# Patient Record
Sex: MALE | Race: WHITE | NOT HISPANIC OR LATINO | Employment: OTHER | URBAN - METROPOLITAN AREA
[De-identification: names, ages, dates, MRNs, and addresses within clinical notes are randomized per-mention and may not be internally consistent; named-entity substitution may affect disease eponyms.]

---

## 2017-05-03 ENCOUNTER — CLINICAL SUPPORT (OUTPATIENT)
Dept: SMOKING CESSATION | Facility: CLINIC | Age: 62
End: 2017-05-03
Payer: COMMERCIAL

## 2017-05-03 DIAGNOSIS — F17.200 NICOTINE DEPENDENCE: Primary | ICD-10-CM

## 2017-05-03 PROCEDURE — 99407 BEHAV CHNG SMOKING > 10 MIN: CPT | Mod: S$GLB,,,

## 2017-06-01 ENCOUNTER — CLINICAL SUPPORT (OUTPATIENT)
Dept: SMOKING CESSATION | Facility: CLINIC | Age: 62
End: 2017-06-01
Payer: COMMERCIAL

## 2017-06-01 DIAGNOSIS — F17.200 NICOTINE DEPENDENCE: ICD-10-CM

## 2017-06-01 PROCEDURE — 99404 PREV MED CNSL INDIV APPRX 60: CPT | Mod: S$GLB,,,

## 2017-06-01 PROCEDURE — 99999 PR PBB SHADOW E&M-EST. PATIENT-LVL I: CPT | Mod: PBBFAC,,,

## 2017-06-01 RX ORDER — IBUPROFEN 200 MG
1 TABLET ORAL DAILY
Qty: 14 PATCH | Refills: 0 | Status: SHIPPED | OUTPATIENT
Start: 2017-06-01 | End: 2017-06-15 | Stop reason: SDUPTHER

## 2017-06-01 RX ORDER — VARENICLINE TARTRATE 0.5 (11)-1
KIT ORAL
Qty: 1 PACKAGE | Refills: 0 | Status: SHIPPED | OUTPATIENT
Start: 2017-06-01 | End: 2017-06-29 | Stop reason: DRUGHIGH

## 2017-06-15 ENCOUNTER — CLINICAL SUPPORT (OUTPATIENT)
Dept: SMOKING CESSATION | Facility: CLINIC | Age: 62
End: 2017-06-15
Payer: COMMERCIAL

## 2017-06-15 DIAGNOSIS — F17.200 NICOTINE DEPENDENCE: ICD-10-CM

## 2017-06-15 PROCEDURE — 99402 PREV MED CNSL INDIV APPRX 30: CPT | Mod: S$GLB,,,

## 2017-06-15 PROCEDURE — 99999 PR PBB SHADOW E&M-EST. PATIENT-LVL I: CPT | Mod: PBBFAC,,,

## 2017-06-15 RX ORDER — IBUPROFEN 200 MG
1 TABLET ORAL DAILY
Qty: 14 PATCH | Refills: 0 | Status: SHIPPED | OUTPATIENT
Start: 2017-06-15 | End: 2017-06-29 | Stop reason: SDUPTHER

## 2017-06-15 NOTE — Clinical Note
pt presents smoking less then 10 cigarettes per day,pt is currently wearing the  Nicotine patch every day 21mg, goal set to be smoke free in 3 weeks, discussed with the patient that he will need to ideally quit smoking over the next three weeks while on the starter pack of chantix, this way he will have two more months of this medication regimen to help prevent lapse. Session handout provided to the patient and discussed, pt reports no negative s/e, recommend he continue the 21mg nicotine patch with plan to do decrease in dose of the nicotine patch next f/u,  will follow

## 2017-06-29 ENCOUNTER — CLINICAL SUPPORT (OUTPATIENT)
Dept: SMOKING CESSATION | Facility: CLINIC | Age: 62
End: 2017-06-29
Payer: COMMERCIAL

## 2017-06-29 DIAGNOSIS — F17.200 NICOTINE DEPENDENCE: Primary | ICD-10-CM

## 2017-06-29 PROCEDURE — 99402 PREV MED CNSL INDIV APPRX 30: CPT | Mod: S$GLB,,,

## 2017-06-29 PROCEDURE — 99999 PR PBB SHADOW E&M-EST. PATIENT-LVL I: CPT | Mod: PBBFAC,,,

## 2017-06-29 RX ORDER — IBUPROFEN 200 MG
1 TABLET ORAL DAILY
Qty: 14 PATCH | Refills: 0 | Status: SHIPPED | OUTPATIENT
Start: 2017-06-29 | End: 2017-07-17 | Stop reason: SDUPTHER

## 2017-06-29 RX ORDER — VARENICLINE TARTRATE 1 MG/1
1 TABLET, FILM COATED ORAL 2 TIMES DAILY
Qty: 60 TABLET | Refills: 0 | Status: SHIPPED | OUTPATIENT
Start: 2017-06-29 | End: 2017-07-29

## 2017-06-29 NOTE — PROGRESS NOTES
Individual Follow-Up Form    6/29/2017    Quit Date: 7/13/17      Clinical Status of Patient: Outpatient    Length of Service: 30 minutes    Continuing Medication: yes  Chantix or Patches    Other Medications: n/a     Target Symptoms: Withdrawal and medication side effects. The following were  rated moderate (3) to severe (4) on TCRS:  · Moderate (3): 0  · Severe (4): 0    Comments: pt presents smoking about 10 cigarettes per day, continuing the 21mg patch, as well as chantix starter pack,  He is currently on last week of chantix therefore will order pt another dose of chantix and recommend continuing 2 more months following the starter pack, he is also on a 21mg nicotine patch daily, recommend he continue with this dose for another two weeks, pt has not cut back any more since last appt he has a mindset that he will put them down on 7/13 therefore he has stopped cutting back, recommend that he continue to cut back further and set goal for 5 cigarettes over the next two weeks, session handout provided to the patient and discussed, will continue to monitor      Diagnosis: F17.210    Next Visit: 2 weeks

## 2017-06-29 NOTE — Clinical Note
pt presents smoking about 10 cigarettes per day, continuing the 21mg patch, as well as chantix starter pack,  He is currently on last week of chantix therefore will order pt another dose of chantix and recommend continuing 2 more months following the starter pack, he is also on a 21mg nicotine patch daily, recommend he continue with this dose for another two weeks, pt has not cut back any more since last appt he has a mindset that he will put them down on 7/13 therefore he has stopped cutting back, recommend that he continue to cut back further and set goal for 5 cigarettes over the next two weeks, session handout provided to the patient and discussed, will continue to monitor

## 2017-07-17 ENCOUNTER — CLINICAL SUPPORT (OUTPATIENT)
Dept: SMOKING CESSATION | Facility: CLINIC | Age: 62
End: 2017-07-17
Payer: COMMERCIAL

## 2017-07-17 DIAGNOSIS — F17.200 NICOTINE DEPENDENCE: ICD-10-CM

## 2017-07-17 PROCEDURE — 99402 PREV MED CNSL INDIV APPRX 30: CPT | Mod: S$GLB,,,

## 2017-07-17 PROCEDURE — 99999 PR PBB SHADOW E&M-EST. PATIENT-LVL I: CPT | Mod: PBBFAC,,,

## 2017-07-17 RX ORDER — IBUPROFEN 200 MG
1 TABLET ORAL DAILY
Qty: 14 PATCH | Refills: 0 | Status: SHIPPED | OUTPATIENT
Start: 2017-07-17 | End: 2017-07-31 | Stop reason: SDUPTHER

## 2017-07-17 NOTE — PROGRESS NOTES
Individual Follow-Up Form    7/17/2017    Quit Date: 7/15/17    Clinical Status of Patient: Outpatient    Length of Service: 30 minutes    Continuing Medication: yes  Chantix or Patches    Other Medications: n/a     Target Symptoms: Withdrawal and medication side effects. The following were  rated moderate (3) to severe (4) on TCRS:  · Moderate (3): 0  · Severe (4): 0    Comments: pt presents quit however has had 3 slips smoking 3 cigarettes since his quit, he is not taking chantix consistently, educated the patient on taking the chantix compliantly. He is currently on the nicotine patch 21mg and feel it is imperative to keep him on this dose for another 2 weeks, session handout provided and discussed, will continue to monitor and follow     Diagnosis: F17.210    Next Visit: 2 weeks

## 2017-07-17 NOTE — Clinical Note
pt presents quit however has had 3 slips smoking 3 cigarettes since his quit, he is not taking chantix consistently, educated the patient on taking the chantix compliantly. He is currently on the nicotine patch 21mg and feel it is imperative to keep him on this dose for another 2 weeks, session handout provided and discussed, will continue to monitor and follow

## 2017-07-31 ENCOUNTER — CLINICAL SUPPORT (OUTPATIENT)
Dept: SMOKING CESSATION | Facility: CLINIC | Age: 62
End: 2017-07-31
Payer: COMMERCIAL

## 2017-07-31 DIAGNOSIS — F17.200 NICOTINE DEPENDENCE: Primary | ICD-10-CM

## 2017-07-31 PROCEDURE — 99402 PREV MED CNSL INDIV APPRX 30: CPT | Mod: S$GLB,,,

## 2017-07-31 RX ORDER — IBUPROFEN 200 MG
1 TABLET ORAL DAILY
Qty: 14 PATCH | Refills: 0 | Status: SHIPPED | OUTPATIENT
Start: 2017-07-31 | End: 2017-08-30

## 2017-07-31 NOTE — Clinical Note
Patient presents quit as of today, he decide that when he woke up this morning this would be the day he would quit. He is not taking chantix consistently, educated the patient on taking the chantix compliantly. He is currently on the nicotine patch 21mg QD will decrease to 14mg in  another 2 weeks, session handout provided and discussed, will continue to monitor and follow.

## 2017-07-31 NOTE — PROGRESS NOTES
Individual Follow-Up Form    7/31/2017    Quit Date:     Clinical Status of Patient: Outpatient    Length of Service: 30 minutes    Continuing Medication: yes  Chantix    Other Medications: 21 mg Nicotine patch     Target Symptoms: Withdrawal and medication side effects. The following were  rated moderate (3) to severe (4) on TCRS:  · Moderate (3): 0  · Severe (4): 0    Comments: Patient presents quit as of today, he decide that when he woke up this morning this would be the day he would quit. He is not taking chantix consistently, educated the patient on taking the chantix compliantly. He is currently on the nicotine patch 21mg QD will decrease to 14mg in  another 2 weeks, session handout provided and discussed, will continue to monitor and follow.    Diagnosis: F17.210    Next Visit: 2 weeks

## 2017-08-18 ENCOUNTER — TELEPHONE (OUTPATIENT)
Dept: FAMILY MEDICINE | Facility: CLINIC | Age: 62
End: 2017-08-18

## 2017-08-18 NOTE — TELEPHONE ENCOUNTER
----- Message from Danae Guadarrama MD sent at 8/3/2017 12:24 PM CDT -----  Who is patient's PCP?  ----- Message -----  [facility]  Sent: 7/31/2017   4:22 PM  To: John Christina MD    Patient presents quit as of today, he decide that when he woke up this morning this would be the day he would quit. He is not taking chantix consistently, educated the patient on taking the chantix compliantly. He is currently on the nicotine patch 21mg QD will decrease to 14mg in  another 2 weeks, session handout provided and discussed, will continue to monitor and follow.

## 2017-08-18 NOTE — TELEPHONE ENCOUNTER
Spoke w/patient, states on his insurance card he has a female listed as his PCP. Declined appt. Sates if he get sick he will just come in and see anyone.

## 2017-12-21 ENCOUNTER — TELEPHONE (OUTPATIENT)
Dept: SMOKING CESSATION | Facility: CLINIC | Age: 62
End: 2017-12-21

## 2017-12-27 ENCOUNTER — CLINICAL SUPPORT (OUTPATIENT)
Dept: SMOKING CESSATION | Facility: CLINIC | Age: 62
End: 2017-12-27
Payer: COMMERCIAL

## 2017-12-27 DIAGNOSIS — F17.200 NICOTINE DEPENDENCE: Primary | ICD-10-CM

## 2017-12-27 PROCEDURE — 99407 BEHAV CHNG SMOKING > 10 MIN: CPT | Mod: S$GLB,,,

## 2018-01-02 ENCOUNTER — CLINICAL SUPPORT (OUTPATIENT)
Dept: SMOKING CESSATION | Facility: CLINIC | Age: 63
End: 2018-01-02
Payer: COMMERCIAL

## 2018-01-02 DIAGNOSIS — F17.200 NICOTINE DEPENDENCE: Primary | ICD-10-CM

## 2018-01-02 PROCEDURE — 99999 PR PBB SHADOW E&M-EST. PATIENT-LVL I: CPT | Mod: PBBFAC,,,

## 2018-01-02 PROCEDURE — 99404 PREV MED CNSL INDIV APPRX 60: CPT | Mod: S$GLB,,,

## 2018-01-02 RX ORDER — VARENICLINE TARTRATE 0.5 (11)-1
KIT ORAL
Qty: 1 PACKAGE | Refills: 0 | Status: SHIPPED | OUTPATIENT
Start: 2018-01-02 | End: 2018-01-18 | Stop reason: CLARIF

## 2018-01-02 RX ORDER — IBUPROFEN 200 MG
1 TABLET ORAL DAILY
Qty: 14 PATCH | Refills: 0 | Status: SHIPPED | OUTPATIENT
Start: 2018-01-02 | End: 2018-01-18 | Stop reason: SDUPTHER

## 2018-01-02 RX ORDER — MICONAZOLE NITRATE 2 %
2 CREAM (GRAM) TOPICAL
Qty: 100 EACH | Refills: 0 | Status: SHIPPED | OUTPATIENT
Start: 2018-01-02 | End: 2018-02-02

## 2018-01-18 ENCOUNTER — CLINICAL SUPPORT (OUTPATIENT)
Dept: SMOKING CESSATION | Facility: CLINIC | Age: 63
End: 2018-01-18
Payer: COMMERCIAL

## 2018-01-18 DIAGNOSIS — F17.200 NICOTINE DEPENDENCE: ICD-10-CM

## 2018-01-18 PROCEDURE — 99999 PR PBB SHADOW E&M-EST. PATIENT-LVL I: CPT | Mod: PBBFAC,,,

## 2018-01-18 PROCEDURE — 99402 PREV MED CNSL INDIV APPRX 30: CPT | Mod: S$GLB,,,

## 2018-01-18 RX ORDER — IBUPROFEN 200 MG
1 TABLET ORAL DAILY
Qty: 14 PATCH | Refills: 0 | Status: SHIPPED | OUTPATIENT
Start: 2018-01-18 | End: 2018-02-18

## 2018-01-18 NOTE — Clinical Note
Comments: patient presents for f/u smoking 10 cigarettes per day, prior to the start of the program patient was smoking 30 cigarettes per day however has been stuck at 10 for a month now, recommend he continue the 21mg nicotine patch and increase use of oral NRT to substitute the cigarettes, session handout provided, goal set for the patient to  Be down to 5 cigarettes over the next two weeks, denies any negative s/e, will continue to monitor and follow

## 2018-01-18 NOTE — PROGRESS NOTES
Individual Follow-Up Form    1/18/2018    Quit Date: not selected at this time     Clinical Status of Patient: Outpatient    Length of Service: 30 minutes    Continuing Medication: yes  Patches or Nicotine gum    Other Medications: n/a     Target Symptoms: Withdrawal and medication side effects. The following were  rated moderate (3) to severe (4) on TCRS:  · Moderate (3): 0  · Severe (4): 0    Comments: patient presents for f/u smoking 10 cigarettes per day, prior to the start of the program patient was smoking 30 cigarettes per day however has been stuck at 10 for a month now, recommend he continue the 21mg nicotine patch and increase use of oral NRT to substitute the cigarettes, session handout provided, goal set for the patient to  Be down to 5 cigarettes over the next two weeks, denies any negative s/e, will continue to monitor and follow     Diagnosis: F17.210    Next Visit: 2 weeks

## 2018-02-01 ENCOUNTER — TELEPHONE (OUTPATIENT)
Dept: SMOKING CESSATION | Facility: CLINIC | Age: 63
End: 2018-02-01

## 2018-04-17 ENCOUNTER — CLINICAL SUPPORT (OUTPATIENT)
Dept: SMOKING CESSATION | Facility: CLINIC | Age: 63
End: 2018-04-17
Payer: COMMERCIAL

## 2018-04-17 DIAGNOSIS — F17.200 NICOTINE DEPENDENCE: Primary | ICD-10-CM

## 2018-04-17 PROCEDURE — 99407 BEHAV CHNG SMOKING > 10 MIN: CPT | Mod: S$GLB,,,

## 2018-05-07 ENCOUNTER — CLINICAL SUPPORT (OUTPATIENT)
Dept: SMOKING CESSATION | Facility: CLINIC | Age: 63
End: 2018-05-07
Payer: COMMERCIAL

## 2018-05-07 DIAGNOSIS — F17.200 NICOTINE DEPENDENCE: Primary | ICD-10-CM

## 2018-05-07 PROCEDURE — 99404 PREV MED CNSL INDIV APPRX 60: CPT | Mod: S$GLB,,,

## 2018-05-07 PROCEDURE — 99999 PR PBB SHADOW E&M-EST. PATIENT-LVL I: CPT | Mod: PBBFAC,,,

## 2018-05-07 RX ORDER — DIPHENHYDRAMINE HCL 25 MG
4 CAPSULE ORAL
Qty: 100 EACH | Refills: 0 | Status: SHIPPED | OUTPATIENT
Start: 2018-05-07 | End: 2018-06-07

## 2018-05-07 RX ORDER — IBUPROFEN 200 MG
1 TABLET ORAL DAILY
Qty: 14 PATCH | Refills: 0 | Status: SHIPPED | OUTPATIENT
Start: 2018-05-07 | End: 2018-06-07

## 2018-05-07 NOTE — Clinical Note
Patient presents for intake smoking 1 pack per day to 1.5 packs per day, recommend the 21mg nicotine patch daily in conjunction with the 2mg nicotine gum, recommend an abrupt quit,  discussed strategies to help cut back and to help break the routine and habit associated with smoking, intake handout provided to the patient and discussed, all prescribed NRT discussed in depth as well as tobacco cessation medication s/e as well as usage discussed, contact card provided to the patient. Will continue to follow every two weeks while in the program.

## 2018-06-29 ENCOUNTER — CLINICAL SUPPORT (OUTPATIENT)
Dept: SMOKING CESSATION | Facility: CLINIC | Age: 63
End: 2018-06-29
Payer: COMMERCIAL

## 2018-06-29 DIAGNOSIS — F17.200 NICOTINE DEPENDENCE: Primary | ICD-10-CM

## 2018-06-29 PROCEDURE — 99407 BEHAV CHNG SMOKING > 10 MIN: CPT | Mod: S$GLB,,,

## 2018-06-29 NOTE — PROGRESS NOTES
Called pt to f/u on his 6 month smoking cessation quit status for quit #5. Pt did come back for a quit #6 on 5/7/18, but never followed up. Pt stated he is still smoking. Informed him he has benefits available and is able to continue. Pt stated he has joined program several times and it has not worked for him. He is not interested in program. Will resolve quit #5 episode.

## 2019-01-07 ENCOUNTER — CLINICAL SUPPORT (OUTPATIENT)
Dept: SMOKING CESSATION | Facility: CLINIC | Age: 64
End: 2019-01-07
Payer: COMMERCIAL

## 2019-01-07 DIAGNOSIS — F17.200 NICOTINE DEPENDENCE: Primary | ICD-10-CM

## 2019-01-07 PROCEDURE — 99407 BEHAV CHNG SMOKING > 10 MIN: CPT | Mod: S$GLB,,,

## 2019-01-07 PROCEDURE — 99407 PR TOBACCO USE CESSATION INTENSIVE >10 MINUTES: ICD-10-PCS | Mod: S$GLB,,,

## 2019-01-07 NOTE — PROGRESS NOTES
Spoke with patient today in regards to smoking cessation progress 12 month phone follow up for quit 5 and 3/6 month for quit 6, states not tobacco free. Patient is interested in returning to the smoking cessation program scheduled an appointment . Informed patient of benefit period, phone follow up at 1 year, and contact information. Will resolve smart form for quit 5 and complete smart form for 3/6 month phone follow up on Quit attempt #6.

## 2019-01-14 ENCOUNTER — CLINICAL SUPPORT (OUTPATIENT)
Dept: SMOKING CESSATION | Facility: CLINIC | Age: 64
End: 2019-01-14
Payer: COMMERCIAL

## 2019-01-14 DIAGNOSIS — F17.200 NICOTINE DEPENDENCE: Primary | ICD-10-CM

## 2019-01-14 PROCEDURE — 99999 PR PBB SHADOW E&M-EST. PATIENT-LVL I: ICD-10-PCS | Mod: PBBFAC,,,

## 2019-01-14 PROCEDURE — 99404 PR PREVENT COUNSEL,INDIV,60 MIN: ICD-10-PCS | Mod: S$GLB,,,

## 2019-01-14 PROCEDURE — 99404 PREV MED CNSL INDIV APPRX 60: CPT | Mod: S$GLB,,,

## 2019-01-14 PROCEDURE — 99999 PR PBB SHADOW E&M-EST. PATIENT-LVL I: CPT | Mod: PBBFAC,,,

## 2019-01-14 RX ORDER — BUPROPION HYDROCHLORIDE 150 MG/1
150 TABLET, EXTENDED RELEASE ORAL 2 TIMES DAILY
Qty: 60 TABLET | Refills: 11 | Status: SHIPPED | OUTPATIENT
Start: 2019-01-14 | End: 2019-02-11

## 2019-01-14 RX ORDER — IBUPROFEN 200 MG
1 TABLET ORAL DAILY
Qty: 14 PATCH | Refills: 0 | Status: SHIPPED | OUTPATIENT
Start: 2019-01-14 | End: 2019-01-28 | Stop reason: SDUPTHER

## 2019-01-14 RX ORDER — DM/P-EPHED/ACETAMINOPH/DOXYLAM 30-7.5/3
2 LIQUID (ML) ORAL
Qty: 100 LOZENGE | Refills: 0 | Status: SHIPPED | OUTPATIENT
Start: 2019-01-14 | End: 2019-01-28 | Stop reason: SDUPTHER

## 2019-01-14 NOTE — Clinical Note
Pt presents for intakr smoking 1 pk of cigarettes per day, after assessment and disucssion recommend the 21mg nicotine patch in conjunction with the 2mg nicotine lozenge, recommend an abrupt quit and for patient to pick a quit date and start the NRT on that day. discussed strategies to help cut back and to help break the routine and habit associated with smoking, intake handout provided to the patient and discussed, all prescribed NRT discussed in depth as well as tobacco cessation medication s/e as well as usage discussed, contact card provided to the patient. Will continue to follow every two weeks while in the program.

## 2019-01-28 ENCOUNTER — CLINICAL SUPPORT (OUTPATIENT)
Dept: SMOKING CESSATION | Facility: CLINIC | Age: 64
End: 2019-01-28
Payer: COMMERCIAL

## 2019-01-28 DIAGNOSIS — F17.200 NICOTINE DEPENDENCE: ICD-10-CM

## 2019-01-28 PROCEDURE — 99402 PREV MED CNSL INDIV APPRX 30: CPT | Mod: S$GLB,,,

## 2019-01-28 PROCEDURE — 99999 PR PBB SHADOW E&M-EST. PATIENT-LVL I: ICD-10-PCS | Mod: PBBFAC,,,

## 2019-01-28 PROCEDURE — 99999 PR PBB SHADOW E&M-EST. PATIENT-LVL I: CPT | Mod: PBBFAC,,,

## 2019-01-28 PROCEDURE — 99402 PR PREVENT COUNSEL,INDIV,30 MIN: ICD-10-PCS | Mod: S$GLB,,,

## 2019-01-28 RX ORDER — IBUPROFEN 200 MG
1 TABLET ORAL DAILY
Qty: 14 PATCH | Refills: 0 | Status: SHIPPED | OUTPATIENT
Start: 2019-01-28 | End: 2019-02-11 | Stop reason: SDUPTHER

## 2019-01-28 RX ORDER — DM/P-EPHED/ACETAMINOPH/DOXYLAM 30-7.5/3
2 LIQUID (ML) ORAL
Qty: 100 LOZENGE | Refills: 0 | Status: SHIPPED | OUTPATIENT
Start: 2019-01-28 | End: 2019-02-26

## 2019-01-28 NOTE — PROGRESS NOTES
"Individual Follow-Up Form    1/28/2019    Quit Date: not selected at this time     Clinical Status of Patient: Outpatient    Length of Service: 30 minutes    Continuing Medication: yes  Wellbutrin, Patches or Nicotine Lozenges    Other Medications: n/a     Target Symptoms: Withdrawal and medication side effects. The following were  rated moderate (3) to severe (4) on TCRS:  · Moderate (3): 0  · Severe (4): 0    Comments: pt presents for follow up smoking 15 cigarettes per day, he has cut out about 5 cigarettes from his routine and that's with the 21mg nicotine patch and the 2mg nicotine lozenge about 10 per day, explained to the pt that "nicotine wise" he is getting the equivalence of 2-2.5 pks per day since he continues to smoke, it was recommended that he do an abrupt quit, pt did states that he recently was smoking 2 pks per day but started to cut back before attending his intake, recommend that he continue his regimen and continue to work on strategies to eliminate tobacco. Plan is for the patient to be closer to his quit date in the next two weeks. Will follow, session handout provided and discussed     Diagnosis: F17.210    Next Visit: 2 weeks  "

## 2019-02-11 ENCOUNTER — CLINICAL SUPPORT (OUTPATIENT)
Dept: SMOKING CESSATION | Facility: CLINIC | Age: 64
End: 2019-02-11
Payer: COMMERCIAL

## 2019-02-11 DIAGNOSIS — F17.200 NICOTINE DEPENDENCE: ICD-10-CM

## 2019-02-11 RX ORDER — DM/P-EPHED/ACETAMINOPH/DOXYLAM 30-7.5/3
2 LIQUID (ML) ORAL
Qty: 100 LOZENGE | Refills: 0 | Status: SHIPPED | OUTPATIENT
Start: 2019-02-11 | End: 2019-02-26

## 2019-02-11 RX ORDER — IBUPROFEN 200 MG
1 TABLET ORAL DAILY
Qty: 14 PATCH | Refills: 0 | Status: SHIPPED | OUTPATIENT
Start: 2019-02-11 | End: 2019-02-26 | Stop reason: SDUPTHER

## 2019-02-26 ENCOUNTER — CLINICAL SUPPORT (OUTPATIENT)
Dept: SMOKING CESSATION | Facility: CLINIC | Age: 64
End: 2019-02-26
Payer: COMMERCIAL

## 2019-02-26 DIAGNOSIS — F17.200 NICOTINE DEPENDENCE: ICD-10-CM

## 2019-02-26 PROCEDURE — 99402 PREV MED CNSL INDIV APPRX 30: CPT | Mod: S$GLB,,,

## 2019-02-26 PROCEDURE — 99402 PR PREVENT COUNSEL,INDIV,30 MIN: ICD-10-PCS | Mod: S$GLB,,,

## 2019-02-26 PROCEDURE — 99999 PR PBB SHADOW E&M-EST. PATIENT-LVL I: CPT | Mod: PBBFAC,,,

## 2019-02-26 PROCEDURE — 99999 PR PBB SHADOW E&M-EST. PATIENT-LVL I: ICD-10-PCS | Mod: PBBFAC,,,

## 2019-02-26 RX ORDER — DM/P-EPHED/ACETAMINOPH/DOXYLAM 30-7.5/3
2 LIQUID (ML) ORAL
Qty: 100 LOZENGE | Refills: 0 | Status: SHIPPED | OUTPATIENT
Start: 2019-02-26 | End: 2019-03-29

## 2019-02-26 RX ORDER — IBUPROFEN 200 MG
1 TABLET ORAL DAILY
Qty: 14 PATCH | Refills: 0 | Status: SHIPPED | OUTPATIENT
Start: 2019-02-26 | End: 2019-03-29

## 2019-02-26 NOTE — PROGRESS NOTES
Individual Follow-Up Form    2/26/2019    Quit Date: not selected at this time     Clinical Status of Patient: Outpatient    Length of Service: 30 minutes    Continuing Medication: yes  Patches or Nicotine Lozenges    Other Medications: n/a     Target Symptoms: Withdrawal and medication side effects. The following were  rated moderate (3) to severe (4) on TCRS:  · Moderate (3): 0  · Severe (4): 0    Comments: pt presents for follow up smoking about 10 cigarettes per day, he is currently on the 21mg nicotine patches and using the 2mg nicotine lozenge throughout the day as well as the cinommon tooth picks, recommend he continue this dose of the nicotine patches and the lozenge, recommend he continue working on rate reduction, this has been a slow process however he is progressing, down from over 2 pks per day, session handout provided and discussed, will continue to monitor and follow     Diagnosis: F17.210    Next Visit: 2 weeks

## 2019-02-26 NOTE — Clinical Note
Comments: pt presents for follow up smoking about 10 cigarettes per day, he is currently on the 21mg nicotine patches and using the 2mg nicotine lozenge throughout the day as well as the cinommon tooth picks, recommend he continue this dose of the nicotine patches and the lozenge, recommend he continue working on rate reduction, this has been a slow process however he is progressing, down from over 2 pks per day, session handout provided and discussed, will continue to monitor and follow

## 2019-05-24 ENCOUNTER — CLINICAL SUPPORT (OUTPATIENT)
Dept: SMOKING CESSATION | Facility: CLINIC | Age: 64
End: 2019-05-24
Payer: COMMERCIAL

## 2019-05-24 DIAGNOSIS — F17.200 NICOTINE DEPENDENCE: Primary | ICD-10-CM

## 2019-05-24 PROCEDURE — 99407 PR TOBACCO USE CESSATION INTENSIVE >10 MINUTES: ICD-10-PCS | Mod: S$GLB,,,

## 2019-05-24 PROCEDURE — 99407 BEHAV CHNG SMOKING > 10 MIN: CPT | Mod: S$GLB,,,

## 2019-05-24 NOTE — PROGRESS NOTES
Called pt to f/u on his 12 month smoking cessation quit status. Pt stated he is still smoking. Informed him he has benefits available and is able to rejoin. Pt scheduled appointment for quit #7 on 6/4/19. Informed him of benefit period, phone follow ups, and contact information. Will complete smart form and resolve #5 episode. Also completed 3 month for quit #6.

## 2019-08-02 ENCOUNTER — CLINICAL SUPPORT (OUTPATIENT)
Dept: SMOKING CESSATION | Facility: CLINIC | Age: 64
End: 2019-08-02
Payer: COMMERCIAL

## 2019-08-02 DIAGNOSIS — F17.200 NICOTINE DEPENDENCE: Primary | ICD-10-CM

## 2019-08-02 PROCEDURE — 99407 BEHAV CHNG SMOKING > 10 MIN: CPT | Mod: S$GLB,,,

## 2019-08-02 PROCEDURE — 99407 PR TOBACCO USE CESSATION INTENSIVE >10 MINUTES: ICD-10-PCS | Mod: S$GLB,,,

## 2019-08-02 NOTE — PROGRESS NOTES
Called pt to f/u on his 6 month smoking cessation quit status. Pt stated he is still smoking, but was able to cut back. Informed him he has benefits available and is able to rejoin. Pt not interested in program, stated he has been through program many times and he has not been able to become quit. Advised him not to be discouraged, that it's such a hard habit to break and sometimes it can take several attempts to become tobacco free. Informed him of benefit period, phone follow ups, and contact information. Will complete smart form and will follow up in 6 months for 1 year follow up.

## 2020-01-15 ENCOUNTER — CLINICAL SUPPORT (OUTPATIENT)
Dept: SMOKING CESSATION | Facility: CLINIC | Age: 65
End: 2020-01-15
Payer: COMMERCIAL

## 2020-01-15 DIAGNOSIS — F17.200 NICOTINE DEPENDENCE: Primary | ICD-10-CM

## 2020-01-15 PROCEDURE — 99406 BEHAV CHNG SMOKING 3-10 MIN: CPT | Mod: S$GLB,,,

## 2020-01-15 PROCEDURE — 99406 PR TOBACCO USE CESSATION INTERMEDIATE 3-10 MINUTES: ICD-10-PCS | Mod: S$GLB,,,

## 2020-01-15 NOTE — PROGRESS NOTES
Spoke to patient in regards to 12 month quit 7 episode phone follow up. Patient is not tobacco free at this time. He stated that he went to appointment last week and the admit  did not have an appoinment for him. No contact or appointments were scheduled since last year. He hung up after he said he was interested anymore. Will complete/resolve smart form for Quit 7.

## 2023-03-06 ENCOUNTER — OFFICE VISIT (OUTPATIENT)
Dept: FAMILY MEDICINE | Facility: CLINIC | Age: 68
End: 2023-03-06
Payer: MEDICARE

## 2023-03-06 VITALS
TEMPERATURE: 99 F | BODY MASS INDEX: 20.07 KG/M2 | HEIGHT: 66 IN | DIASTOLIC BLOOD PRESSURE: 58 MMHG | WEIGHT: 124.88 LBS | OXYGEN SATURATION: 97 % | SYSTOLIC BLOOD PRESSURE: 122 MMHG | HEART RATE: 103 BPM

## 2023-03-06 DIAGNOSIS — Z23 NEED FOR PNEUMOCOCCAL VACCINE: ICD-10-CM

## 2023-03-06 DIAGNOSIS — K21.9 GASTROESOPHAGEAL REFLUX DISEASE, UNSPECIFIED WHETHER ESOPHAGITIS PRESENT: ICD-10-CM

## 2023-03-06 DIAGNOSIS — Z00.00 ANNUAL PHYSICAL EXAM: ICD-10-CM

## 2023-03-06 DIAGNOSIS — Z72.0 TOBACCO ABUSE: ICD-10-CM

## 2023-03-06 DIAGNOSIS — Z12.5 PROSTATE CANCER SCREENING: ICD-10-CM

## 2023-03-06 DIAGNOSIS — Z85.819 HISTORY OF ORAL CANCER: ICD-10-CM

## 2023-03-06 DIAGNOSIS — Z00.00 ENCOUNTER FOR MEDICAL EXAMINATION TO ESTABLISH CARE: Primary | ICD-10-CM

## 2023-03-06 DIAGNOSIS — R63.0 ANOREXIA: ICD-10-CM

## 2023-03-06 PROBLEM — C04.9 SQUAMOUS CELL CARCINOMA OF FLOOR OF MOUTH: Status: ACTIVE | Noted: 2023-03-06

## 2023-03-06 PROCEDURE — 1160F PR REVIEW ALL MEDS BY PRESCRIBER/CLIN PHARMACIST DOCUMENTED: ICD-10-PCS | Mod: CPTII,S$GLB,, | Performed by: FAMILY MEDICINE

## 2023-03-06 PROCEDURE — 1159F PR MEDICATION LIST DOCUMENTED IN MEDICAL RECORD: ICD-10-PCS | Mod: CPTII,S$GLB,, | Performed by: FAMILY MEDICINE

## 2023-03-06 PROCEDURE — 99397 PER PM REEVAL EST PAT 65+ YR: CPT | Mod: S$GLB,,, | Performed by: FAMILY MEDICINE

## 2023-03-06 PROCEDURE — 3074F SYST BP LT 130 MM HG: CPT | Mod: CPTII,S$GLB,, | Performed by: FAMILY MEDICINE

## 2023-03-06 PROCEDURE — 90677 PNEUMOCOCCAL CONJUGATE VACCINE 20-VALENT: ICD-10-PCS | Mod: S$GLB,,, | Performed by: FAMILY MEDICINE

## 2023-03-06 PROCEDURE — 3074F PR MOST RECENT SYSTOLIC BLOOD PRESSURE < 130 MM HG: ICD-10-PCS | Mod: CPTII,S$GLB,, | Performed by: FAMILY MEDICINE

## 2023-03-06 PROCEDURE — 1126F PR PAIN SEVERITY QUANTIFIED, NO PAIN PRESENT: ICD-10-PCS | Mod: CPTII,S$GLB,, | Performed by: FAMILY MEDICINE

## 2023-03-06 PROCEDURE — G0009 ADMIN PNEUMOCOCCAL VACCINE: HCPCS | Mod: S$GLB,,, | Performed by: FAMILY MEDICINE

## 2023-03-06 PROCEDURE — 3078F DIAST BP <80 MM HG: CPT | Mod: CPTII,S$GLB,, | Performed by: FAMILY MEDICINE

## 2023-03-06 PROCEDURE — 3288F FALL RISK ASSESSMENT DOCD: CPT | Mod: CPTII,S$GLB,, | Performed by: FAMILY MEDICINE

## 2023-03-06 PROCEDURE — 1101F PT FALLS ASSESS-DOCD LE1/YR: CPT | Mod: CPTII,S$GLB,, | Performed by: FAMILY MEDICINE

## 2023-03-06 PROCEDURE — 3008F PR BODY MASS INDEX (BMI) DOCUMENTED: ICD-10-PCS | Mod: CPTII,S$GLB,, | Performed by: FAMILY MEDICINE

## 2023-03-06 PROCEDURE — 99999 PR PBB SHADOW E&M-NEW PATIENT-LVL IV: ICD-10-PCS | Mod: PBBFAC,,, | Performed by: FAMILY MEDICINE

## 2023-03-06 PROCEDURE — 99397 PR PREVENTIVE VISIT,EST,65 & OVER: ICD-10-PCS | Mod: S$GLB,,, | Performed by: FAMILY MEDICINE

## 2023-03-06 PROCEDURE — 3008F BODY MASS INDEX DOCD: CPT | Mod: CPTII,S$GLB,, | Performed by: FAMILY MEDICINE

## 2023-03-06 PROCEDURE — 90677 PCV20 VACCINE IM: CPT | Mod: S$GLB,,, | Performed by: FAMILY MEDICINE

## 2023-03-06 PROCEDURE — 1126F AMNT PAIN NOTED NONE PRSNT: CPT | Mod: CPTII,S$GLB,, | Performed by: FAMILY MEDICINE

## 2023-03-06 PROCEDURE — 3288F PR FALLS RISK ASSESSMENT DOCUMENTED: ICD-10-PCS | Mod: CPTII,S$GLB,, | Performed by: FAMILY MEDICINE

## 2023-03-06 PROCEDURE — 1101F PR PT FALLS ASSESS DOC 0-1 FALLS W/OUT INJ PAST YR: ICD-10-PCS | Mod: CPTII,S$GLB,, | Performed by: FAMILY MEDICINE

## 2023-03-06 PROCEDURE — G0009 PNEUMOCOCCAL CONJUGATE VACCINE 20-VALENT: ICD-10-PCS | Mod: S$GLB,,, | Performed by: FAMILY MEDICINE

## 2023-03-06 PROCEDURE — 99999 PR PBB SHADOW E&M-NEW PATIENT-LVL IV: CPT | Mod: PBBFAC,,, | Performed by: FAMILY MEDICINE

## 2023-03-06 PROCEDURE — 3078F PR MOST RECENT DIASTOLIC BLOOD PRESSURE < 80 MM HG: ICD-10-PCS | Mod: CPTII,S$GLB,, | Performed by: FAMILY MEDICINE

## 2023-03-06 PROCEDURE — 1160F RVW MEDS BY RX/DR IN RCRD: CPT | Mod: CPTII,S$GLB,, | Performed by: FAMILY MEDICINE

## 2023-03-06 PROCEDURE — 1159F MED LIST DOCD IN RCRD: CPT | Mod: CPTII,S$GLB,, | Performed by: FAMILY MEDICINE

## 2023-03-06 RX ORDER — CYPROHEPTADINE HYDROCHLORIDE 4 MG/1
4 TABLET ORAL 3 TIMES DAILY PRN
Qty: 90 TABLET | Refills: 1 | Status: SHIPPED | OUTPATIENT
Start: 2023-03-06

## 2023-03-06 RX ORDER — PANTOPRAZOLE SODIUM 40 MG/1
40 TABLET, DELAYED RELEASE ORAL DAILY
Qty: 90 TABLET | Refills: 3 | Status: SHIPPED | OUTPATIENT
Start: 2023-03-06 | End: 2024-03-05

## 2023-03-06 RX ORDER — PANTOPRAZOLE SODIUM 40 MG/1
TABLET, DELAYED RELEASE ORAL
COMMUNITY
End: 2023-03-06 | Stop reason: SDUPTHER

## 2023-03-06 NOTE — PROGRESS NOTES
Assessment & Plan  Encounter for medical examination to establish care    Annual physical exam  -     CBC Auto Differential; Future; Expected date: 03/06/2023  -     Comprehensive Metabolic Panel; Future; Expected date: 03/06/2023  -     Lipid Panel; Future; Expected date: 03/06/2023  -     PSA, Screening; Future; Expected date: 03/06/2023    Fasting labs to be scheduled.    Anorexia  -     cyproheptadine (PERIACTIN) 4 mg tablet; Take 1 tablet (4 mg total) by mouth 3 (three) times daily as needed (appetite loss).  Dispense: 90 tablet; Refill: 1    Start cyproheptadine once a day, may titrate dose up as needed.    History of oral cancer  Continue management per specialist care.    Gastroesophageal reflux disease, unspecified whether esophagitis present  -     pantoprazole (PROTONIX) 40 MG tablet; Take 1 tablet (40 mg total) by mouth once daily.  Dispense: 90 tablet; Refill: 3  -     Ambulatory referral/consult to Gastroenterology; Future; Expected date: 03/13/2023    Protonix refilled.  Referral to GI as patient will most likely need EGD and colonoscopy.    Prostate cancer screening  -     PSA, Screening; Future; Expected date: 03/06/2023    Tobacco abuse  -     US Abdominal Aorta; Future; Expected date: 03/06/2023    Patient to follow-up with smoking cessation counselor.    Need for pneumococcal vaccine  -     (In Office Administered) Pneumococcal Conjugate Vaccine (20 Valent) (IM)      Health maintenance reviewed and addressed above.  Recommended tetanus booster.      Follow-up: Follow up in about 1 year (around 3/6/2024).  ______________________________________________________________________    Chief Complaint  Chief Complaint   Patient presents with    Establish Care       HPI  Ajay Garcia is a 67 y.o. male with medical diagnoses as listed in the medical history and problem list that presents to the office to establish care. 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 He states that he is unable to taste food and has a low appetite since having radiation. He drinks Boost shakes for nutrition. He has exposed bone on his mandible for which he was referred to Oral Surgery. He continues to smoke but plans to meet with the smoking cessation counselor for assistance to quit. He is requesting a refill of Protonix because when he had an EGD for placement of a PEG tube, "the doctor said he saw something he didn't like", so he has been on this medication since 2019.     Health Maintenance         Date Due Completion Date    Pneumococcal Vaccines (Age 65+) (1 - PCV) Never done ---    TETANUS VACCINE Never done ---    Abdominal Aortic Aneurysm Screening Never done ---    Lipid Panel 2020 3/3/2015 (Done)    Override on 3/3/2015: Done (order in)    COVID-19 Vaccine (5 - Booster for Moderna series) 2022    Influenza Vaccine (1) Never done ---    Colorectal Cancer Screening 2025 3/3/2015 (Declined)    Override on 3/3/2015: Declined              PAST MEDICAL HISTORY:  Past Medical History:   Diagnosis Date    Cancer        PAST SURGICAL HISTORY:  Past Surgical History:   Procedure Laterality Date    EYE SURGERY  14, 14    cataract surgery on both eyes    FOOT SURGERY      rt foot big toe       SOCIAL HISTORY:  Social History     Socioeconomic History    Marital status:    Tobacco Use    Smoking status: Every Day     Packs/day: 2.00     Types: Cigarettes     Last attempt to quit: 2015     Years since quittin.9   Substance and Sexual Activity    Alcohol use: Yes     Alcohol/week: 0.0 standard drinks    Drug use: No       FAMILY HISTORY:  Family History   Problem Relation Age of Onset    Stroke Mother     Hypertension Mother     Diabetes Mother        ALLERGIES AND MEDICATIONS: updated and reviewed.  Review of patient's allergies indicates:   Allergen Reactions    Ibuprofen Hives     Current Outpatient Medications " "  Medication Sig Dispense Refill    ciprofloxacin (CILOXAN) 0.3 % ophthalmic solution       cyproheptadine (PERIACTIN) 4 mg tablet Take 1 tablet (4 mg total) by mouth 3 (three) times daily as needed (appetite loss). 90 tablet 1    pantoprazole (PROTONIX) 40 MG tablet Take 1 tablet (40 mg total) by mouth once daily. 90 tablet 3    prednisoLONE acetate (PRED FORTE) 1 % DrpS        No current facility-administered medications for this visit.         ROS  Review of Systems   Constitutional:  Positive for appetite change and unexpected weight change. Negative for activity change and fever.   HENT:  Positive for dental problem. Negative for congestion and sore throat.    Eyes:  Negative for photophobia and visual disturbance.   Respiratory:  Negative for cough and shortness of breath.    Cardiovascular:  Negative for chest pain and leg swelling.   Gastrointestinal:  Negative for abdominal pain, constipation, diarrhea, nausea and vomiting.   Endocrine: Negative for polydipsia, polyphagia and polyuria.   Genitourinary:  Negative for dysuria and urgency.   Musculoskeletal:  Negative for arthralgias and gait problem.   Skin:  Negative for color change.   Neurological:  Negative for dizziness, weakness and headaches.   Psychiatric/Behavioral:  Negative for dysphoric mood and sleep disturbance. The patient is not nervous/anxious.          Physical Exam  Vitals:    03/06/23 1516   BP: (!) 122/58   BP Location: Right arm   Patient Position: Sitting   BP Method: Medium (Manual)   Pulse: 103   Temp: 98.6 °F (37 °C)   TempSrc: Oral   SpO2: 97%   Weight: 56.7 kg (124 lb 14.3 oz)   Height: 5' 6" (1.676 m)    Body mass index is 20.16 kg/m².  Weight: 56.7 kg (124 lb 14.3 oz)   Height: 5' 6" (167.6 cm)   Physical Exam  Constitutional:       General: He is not in acute distress.     Appearance: Normal appearance.   HENT:      Head: Normocephalic and atraumatic.   Neck:      Thyroid: No thyromegaly.      Vascular: No carotid bruit. "   Cardiovascular:      Rate and Rhythm: Normal rate and regular rhythm.      Pulses: Normal pulses.      Heart sounds: Normal heart sounds.   Pulmonary:      Effort: Pulmonary effort is normal. No respiratory distress.      Breath sounds: Normal breath sounds.   Musculoskeletal:      Cervical back: Neck supple.   Lymphadenopathy:      Cervical: No cervical adenopathy.   Skin:     General: Skin is warm and dry.      Findings: No rash.   Neurological:      General: No focal deficit present.      Mental Status: He is alert and oriented to person, place, and time.   Psychiatric:         Mood and Affect: Mood normal.         Behavior: Behavior normal.         Thought Content: Thought content normal.

## 2023-03-08 ENCOUNTER — LAB VISIT (OUTPATIENT)
Dept: LAB | Facility: HOSPITAL | Age: 68
End: 2023-03-08
Attending: FAMILY MEDICINE
Payer: MEDICARE

## 2023-03-08 ENCOUNTER — TELEPHONE (OUTPATIENT)
Dept: GASTROENTEROLOGY | Facility: CLINIC | Age: 68
End: 2023-03-08
Payer: MEDICARE

## 2023-03-08 DIAGNOSIS — Z12.5 PROSTATE CANCER SCREENING: ICD-10-CM

## 2023-03-08 DIAGNOSIS — Z00.00 ANNUAL PHYSICAL EXAM: ICD-10-CM

## 2023-03-08 LAB
ALBUMIN SERPL BCP-MCNC: 3.8 G/DL (ref 3.5–5.2)
ALP SERPL-CCNC: 119 U/L (ref 55–135)
ALT SERPL W/O P-5'-P-CCNC: 17 U/L (ref 10–44)
ANION GAP SERPL CALC-SCNC: 10 MMOL/L (ref 8–16)
AST SERPL-CCNC: 27 U/L (ref 10–40)
BASOPHILS # BLD AUTO: 0.03 K/UL (ref 0–0.2)
BASOPHILS NFR BLD: 0.3 % (ref 0–1.9)
BILIRUB SERPL-MCNC: 0.7 MG/DL (ref 0.1–1)
BUN SERPL-MCNC: 6 MG/DL (ref 8–23)
CALCIUM SERPL-MCNC: 9.1 MG/DL (ref 8.7–10.5)
CHLORIDE SERPL-SCNC: 101 MMOL/L (ref 95–110)
CHOLEST SERPL-MCNC: 157 MG/DL (ref 120–199)
CHOLEST/HDLC SERPL: 3.8 {RATIO} (ref 2–5)
CO2 SERPL-SCNC: 28 MMOL/L (ref 23–29)
COMPLEXED PSA SERPL-MCNC: 0.52 NG/ML (ref 0–4)
CREAT SERPL-MCNC: 0.9 MG/DL (ref 0.5–1.4)
DIFFERENTIAL METHOD: ABNORMAL
EOSINOPHIL # BLD AUTO: 0.5 K/UL (ref 0–0.5)
EOSINOPHIL NFR BLD: 5.3 % (ref 0–8)
ERYTHROCYTE [DISTWIDTH] IN BLOOD BY AUTOMATED COUNT: 12.2 % (ref 11.5–14.5)
EST. GFR  (NO RACE VARIABLE): >60 ML/MIN/1.73 M^2
GLUCOSE SERPL-MCNC: 105 MG/DL (ref 70–110)
HCT VFR BLD AUTO: 39.6 % (ref 40–54)
HDLC SERPL-MCNC: 41 MG/DL (ref 40–75)
HDLC SERPL: 26.1 % (ref 20–50)
HGB BLD-MCNC: 13 G/DL (ref 14–18)
IMM GRANULOCYTES # BLD AUTO: 0.02 K/UL (ref 0–0.04)
IMM GRANULOCYTES NFR BLD AUTO: 0.2 % (ref 0–0.5)
LDLC SERPL CALC-MCNC: 70.6 MG/DL (ref 63–159)
LYMPHOCYTES # BLD AUTO: 1.5 K/UL (ref 1–4.8)
LYMPHOCYTES NFR BLD: 15.8 % (ref 18–48)
MCH RBC QN AUTO: 33.2 PG (ref 27–31)
MCHC RBC AUTO-ENTMCNC: 32.8 G/DL (ref 32–36)
MCV RBC AUTO: 101 FL (ref 82–98)
MONOCYTES # BLD AUTO: 1.2 K/UL (ref 0.3–1)
MONOCYTES NFR BLD: 12.6 % (ref 4–15)
NEUTROPHILS # BLD AUTO: 6 K/UL (ref 1.8–7.7)
NEUTROPHILS NFR BLD: 65.8 % (ref 38–73)
NONHDLC SERPL-MCNC: 116 MG/DL
NRBC BLD-RTO: 0 /100 WBC
PLATELET # BLD AUTO: 331 K/UL (ref 150–450)
PMV BLD AUTO: 11.2 FL (ref 9.2–12.9)
POTASSIUM SERPL-SCNC: 4 MMOL/L (ref 3.5–5.1)
PROT SERPL-MCNC: 6.8 G/DL (ref 6–8.4)
RBC # BLD AUTO: 3.91 M/UL (ref 4.6–6.2)
SODIUM SERPL-SCNC: 139 MMOL/L (ref 136–145)
TRIGL SERPL-MCNC: 227 MG/DL (ref 30–150)
WBC # BLD AUTO: 9.18 K/UL (ref 3.9–12.7)

## 2023-03-08 PROCEDURE — 85025 COMPLETE CBC W/AUTO DIFF WBC: CPT | Performed by: FAMILY MEDICINE

## 2023-03-08 PROCEDURE — 36415 COLL VENOUS BLD VENIPUNCTURE: CPT | Mod: PO | Performed by: FAMILY MEDICINE

## 2023-03-08 PROCEDURE — 84153 ASSAY OF PSA TOTAL: CPT | Performed by: FAMILY MEDICINE

## 2023-03-08 PROCEDURE — 80053 COMPREHEN METABOLIC PANEL: CPT | Performed by: FAMILY MEDICINE

## 2023-03-08 PROCEDURE — 80061 LIPID PANEL: CPT | Performed by: FAMILY MEDICINE

## 2023-03-08 NOTE — TELEPHONE ENCOUNTER
----- Message from Maren Arriaga MA sent at 3/8/2023  4:36 PM CST -----  Regarding: referral  Patient has a referral in for gastroenterology and there are no available slots can you please assist with scheduling.                       Thanks in Advance

## 2023-03-08 NOTE — TELEPHONE ENCOUNTER
Attempted to contact patient.  A voice message was left letting patient know he will be placed on our wait list for the next available slot.

## 2023-03-09 ENCOUNTER — TELEPHONE (OUTPATIENT)
Dept: FAMILY MEDICINE | Facility: CLINIC | Age: 68
End: 2023-03-09
Payer: MEDICARE

## 2023-03-09 NOTE — TELEPHONE ENCOUNTER
Please inform patient that his labs show a mild anemia. This is either caused by a B12 or folate deficiency - rather than asking him to have another lab draw to test for these, I suggest just taking a multivitamin that contains both B12 and folate to keep his blood counts up. His triglycerides are elevated, so he should limit his intake of fatty foods (red meat, melendrez, pork, sausage, fried foods, butter). The rest of his labs were normal. Repeat labs before follow up in 1 year.

## 2023-03-21 ENCOUNTER — TELEPHONE (OUTPATIENT)
Dept: GASTROENTEROLOGY | Facility: CLINIC | Age: 68
End: 2023-03-21
Payer: MEDICARE

## 2023-03-21 NOTE — TELEPHONE ENCOUNTER
Attempted to contact patient 3 times.  Yesterday 2 times and today 1 time.  Attempting to offer patient an appointment.  Patient's phone number is disconnected.  A voice message was left on patient's wife phone number.